# Patient Record
Sex: FEMALE | Race: WHITE | NOT HISPANIC OR LATINO | URBAN - METROPOLITAN AREA
[De-identification: names, ages, dates, MRNs, and addresses within clinical notes are randomized per-mention and may not be internally consistent; named-entity substitution may affect disease eponyms.]

---

## 2017-04-26 ENCOUNTER — NEW PATIENT (OUTPATIENT)
Dept: URBAN - METROPOLITAN AREA CLINIC 24 | Facility: CLINIC | Age: 62
End: 2017-04-26
Payer: MEDICARE

## 2017-04-26 DIAGNOSIS — H25.813 COMBINED FORMS OF AGE-RELATED CATARACT, BILATERAL: ICD-10-CM

## 2017-04-26 DIAGNOSIS — E11.9 TYPE 2 DIABETES MELLITUS WITHOUT COMPLICATIONS: Primary | ICD-10-CM

## 2017-04-26 DIAGNOSIS — Z79.84 LONG TERM (CURRENT) USE OF ORAL ANTIDIABETIC DRUGS: ICD-10-CM

## 2017-04-26 DIAGNOSIS — H52.13 MYOPIA, BILATERAL: ICD-10-CM

## 2017-04-26 DIAGNOSIS — H40.012 OPEN ANGLE WITH BORDERLINE FINDINGS, LOW RISK, LEFT EYE: ICD-10-CM

## 2017-04-26 PROCEDURE — 92004 COMPRE OPH EXAM NEW PT 1/>: CPT | Performed by: OPTOMETRIST

## 2017-04-26 PROCEDURE — 92250 FUNDUS PHOTOGRAPHY W/I&R: CPT | Performed by: OPTOMETRIST

## 2017-04-26 PROCEDURE — 76514 ECHO EXAM OF EYE THICKNESS: CPT | Performed by: OPTOMETRIST

## 2017-04-26 ASSESSMENT — KERATOMETRY
OD: 45.98
OS: 46.20

## 2017-04-26 ASSESSMENT — INTRAOCULAR PRESSURE
OD: 19
OS: 22

## 2017-04-26 ASSESSMENT — VISUAL ACUITY
OS: 20/200
OD: 20/25

## 2017-05-15 ENCOUNTER — Encounter (OUTPATIENT)
Dept: URBAN - METROPOLITAN AREA CLINIC 24 | Facility: CLINIC | Age: 62
End: 2017-05-15
Payer: MEDICARE

## 2017-05-15 DIAGNOSIS — H25.812 COMBINED FORMS OF AGE-RELATED CATARACT, LEFT EYE: Primary | ICD-10-CM

## 2017-05-15 PROCEDURE — 99213 OFFICE O/P EST LOW 20 MIN: CPT | Performed by: OPHTHALMOLOGY

## 2017-05-15 PROCEDURE — 92025 CPTRIZED CORNEAL TOPOGRAPHY: CPT | Performed by: OPHTHALMOLOGY

## 2017-05-15 ASSESSMENT — INTRAOCULAR PRESSURE
OS: 21
OS: 21
OD: 21
OD: 21

## 2017-05-30 ENCOUNTER — NEW PATIENT (OUTPATIENT)
Dept: URBAN - METROPOLITAN AREA CLINIC 24 | Facility: CLINIC | Age: 62
End: 2017-05-30
Payer: MEDICARE

## 2017-05-30 DIAGNOSIS — H25.811 COMBINED FORMS OF AGE-RELATED CATARACT, RIGHT EYE: Primary | ICD-10-CM

## 2017-05-30 DIAGNOSIS — H44.23 DEGENERATIVE MYOPIA, BILATERAL: ICD-10-CM

## 2017-05-30 PROCEDURE — 92004 COMPRE OPH EXAM NEW PT 1/>: CPT | Performed by: OPHTHALMOLOGY

## 2017-05-30 PROCEDURE — 92134 CPTRZ OPH DX IMG PST SGM RTA: CPT | Performed by: OPHTHALMOLOGY

## 2017-05-30 ASSESSMENT — INTRAOCULAR PRESSURE
OD: 15
OS: 21

## 2017-06-20 ENCOUNTER — Encounter (OUTPATIENT)
Dept: URBAN - METROPOLITAN AREA CLINIC 24 | Facility: CLINIC | Age: 62
End: 2017-06-20
Payer: MEDICARE

## 2017-06-20 DIAGNOSIS — Z01.818 ENCOUNTER FOR OTHER PREPROCEDURAL EXAMINATION: Primary | ICD-10-CM

## 2017-06-20 PROCEDURE — 99213 OFFICE O/P EST LOW 20 MIN: CPT | Performed by: PHYSICIAN ASSISTANT

## 2017-06-20 RX ORDER — METOPROLOL TARTRATE 50 MG/1
50 MG TABLET, FILM COATED ORAL
Qty: 0 | Refills: 0 | Status: ACTIVE
Start: 2017-06-20

## 2017-06-20 RX ORDER — OXYCODONE AND ACETAMINOPHEN 7.5; 325 MG/1; MG/1
TABLET ORAL
Qty: 0 | Refills: 0 | Status: ACTIVE
Start: 2017-06-20

## 2017-06-20 RX ORDER — MULTIVITAMIN WITH IRON
TABLET ORAL
Qty: 0 | Refills: 0 | Status: ACTIVE
Start: 2017-06-20

## 2017-06-20 RX ORDER — TIZANIDINE HYDROCHLORIDE 4 MG/1
4 MG TABLET ORAL
Qty: 0 | Refills: 0 | Status: ACTIVE
Start: 2017-06-20

## 2017-06-20 RX ORDER — ATORVASTATIN CALCIUM 20 MG/1
20 MG TABLET, FILM COATED ORAL
Qty: 0 | Refills: 0 | Status: ACTIVE
Start: 2017-06-20

## 2017-06-26 ENCOUNTER — SURGERY (OUTPATIENT)
Dept: URBAN - METROPOLITAN AREA SURGERY 12 | Facility: SURGERY | Age: 62
End: 2017-06-26
Payer: MEDICARE

## 2017-06-27 ENCOUNTER — POST OP (OUTPATIENT)
Dept: URBAN - METROPOLITAN AREA CLINIC 24 | Facility: CLINIC | Age: 62
End: 2017-06-27

## 2017-06-27 PROCEDURE — 99024 POSTOP FOLLOW-UP VISIT: CPT | Performed by: OPTOMETRIST

## 2017-06-27 ASSESSMENT — INTRAOCULAR PRESSURE: OS: 22

## 2017-07-05 ENCOUNTER — POST OP (OUTPATIENT)
Dept: URBAN - METROPOLITAN AREA CLINIC 24 | Facility: CLINIC | Age: 62
End: 2017-07-05

## 2017-07-05 DIAGNOSIS — Z96.1 PRESENCE OF INTRAOCULAR LENS: Primary | ICD-10-CM

## 2017-07-05 PROCEDURE — 99024 POSTOP FOLLOW-UP VISIT: CPT | Performed by: OPTOMETRIST

## 2017-07-05 ASSESSMENT — INTRAOCULAR PRESSURE
OD: 21
OS: 24

## 2017-07-05 ASSESSMENT — VISUAL ACUITY
OD: 20/25
OS: 20/25

## 2017-07-17 ENCOUNTER — SURGERY (OUTPATIENT)
Dept: URBAN - METROPOLITAN AREA SURGERY 12 | Facility: SURGERY | Age: 62
End: 2017-07-17
Payer: MEDICARE

## 2017-07-18 ENCOUNTER — POST OP (OUTPATIENT)
Dept: URBAN - METROPOLITAN AREA CLINIC 24 | Facility: CLINIC | Age: 62
End: 2017-07-18

## 2017-07-18 PROCEDURE — 99024 POSTOP FOLLOW-UP VISIT: CPT | Performed by: OPTOMETRIST

## 2017-07-18 RX ORDER — POLYETHYLENE GLYCOL 400 AND PROPYLENE GLYCOL 4; 3 MG/ML; MG/ML
SOLUTION/ DROPS OPHTHALMIC
Qty: 0 | Refills: 0 | Status: ACTIVE
Start: 2017-07-18

## 2017-07-18 ASSESSMENT — INTRAOCULAR PRESSURE: OD: 15

## 2023-06-02 ENCOUNTER — OFFICE VISIT (OUTPATIENT)
Dept: URBAN - METROPOLITAN AREA CLINIC 22 | Facility: CLINIC | Age: 68
End: 2023-06-02
Payer: MEDICARE

## 2023-06-02 DIAGNOSIS — E11.9 TYPE 2 DIABETES MELLITUS W/O COMPLICATION: Primary | ICD-10-CM

## 2023-06-02 DIAGNOSIS — Z79.84 LONG TERM CURRENT USE OF ORAL HYPOGLYCEMIC DRUG: ICD-10-CM

## 2023-06-02 DIAGNOSIS — H52.4 PRESBYOPIA: ICD-10-CM

## 2023-06-02 DIAGNOSIS — H26.493 OTHER SECONDARY CATARACT, BILATERAL: ICD-10-CM

## 2023-06-02 PROCEDURE — 92004 COMPRE OPH EXAM NEW PT 1/>: CPT | Performed by: STUDENT IN AN ORGANIZED HEALTH CARE EDUCATION/TRAINING PROGRAM

## 2023-06-02 PROCEDURE — 92134 CPTRZ OPH DX IMG PST SGM RTA: CPT | Performed by: STUDENT IN AN ORGANIZED HEALTH CARE EDUCATION/TRAINING PROGRAM

## 2023-06-02 ASSESSMENT — VISUAL ACUITY
OS: 20/20
OD: 20/20

## 2023-06-02 ASSESSMENT — INTRAOCULAR PRESSURE
OD: 18
OS: 20

## 2023-06-02 ASSESSMENT — KERATOMETRY: OS: 46.13

## 2023-06-02 NOTE — IMPRESSION/PLAN
Impression: Type 2 diabetes mellitus w/o complication: Q59.5. Plan: Discussed findings, no retinopathy or macular edema OU. Patient educated on importance of well-controlled blood sugar/pressure, risk of vision loss from diabetic retinopathy, and dilated eye exams. Continue management with PCP.

## 2023-07-19 ENCOUNTER — TESTING ONLY (OUTPATIENT)
Dept: URBAN - METROPOLITAN AREA CLINIC 22 | Facility: CLINIC | Age: 68
End: 2023-07-19

## 2023-07-19 DIAGNOSIS — H52.4 PRESBYOPIA: Primary | ICD-10-CM

## 2023-07-19 PROCEDURE — 92015 DETERMINE REFRACTIVE STATE: CPT | Performed by: STUDENT IN AN ORGANIZED HEALTH CARE EDUCATION/TRAINING PROGRAM

## 2023-07-19 ASSESSMENT — VISUAL ACUITY
OD: 20/20
OS: 20/20

## 2023-07-19 ASSESSMENT — KERATOMETRY: OS: 45.75

## 2023-07-19 ASSESSMENT — INTRAOCULAR PRESSURE
OD: 17
OS: 15

## 2023-07-19 NOTE — IMPRESSION/PLAN
Impression: Presbyopia: H52.4. Plan: Fluctuating vision w/ blinks. Rx art tears (sample of [employer] PFAT given). Mild change in axis OU. Patient reports good vision, no diplopia with MRx. New glasses Rx released.